# Patient Record
Sex: MALE | Race: WHITE | NOT HISPANIC OR LATINO | Employment: UNEMPLOYED
[De-identification: names, ages, dates, MRNs, and addresses within clinical notes are randomized per-mention and may not be internally consistent; named-entity substitution may affect disease eponyms.]

---

## 2017-01-08 ENCOUNTER — RECORDS - HEALTHEAST (OUTPATIENT)
Dept: ADMINISTRATIVE | Facility: OTHER | Age: 27
End: 2017-01-08

## 2018-01-01 ENCOUNTER — HOME CARE/HOSPICE - HEALTHEAST (OUTPATIENT)
Dept: HOSPICE | Facility: HOSPICE | Age: 28
End: 2018-01-01

## 2018-01-01 ENCOUNTER — APPOINTMENT (OUTPATIENT)
Dept: GENERAL RADIOLOGY | Facility: CLINIC | Age: 28
End: 2018-01-01
Attending: EMERGENCY MEDICINE
Payer: COMMERCIAL

## 2018-01-01 ENCOUNTER — HOSPITAL ENCOUNTER (EMERGENCY)
Facility: CLINIC | Age: 28
Discharge: ANOTHER HOSPITAL | End: 2018-06-07
Attending: EMERGENCY MEDICINE | Admitting: EMERGENCY MEDICINE
Payer: COMMERCIAL

## 2018-01-01 VITALS
SYSTOLIC BLOOD PRESSURE: 121 MMHG | RESPIRATION RATE: 26 BRPM | TEMPERATURE: 98 F | WEIGHT: 180 LBS | DIASTOLIC BLOOD PRESSURE: 60 MMHG | OXYGEN SATURATION: 96 %

## 2018-01-01 DIAGNOSIS — N17.9 ACUTE KIDNEY INJURY (H): ICD-10-CM

## 2018-01-01 DIAGNOSIS — K72.00 SHOCK LIVER: ICD-10-CM

## 2018-01-01 DIAGNOSIS — I46.9 CARDIAC ARREST (H): ICD-10-CM

## 2018-01-01 DIAGNOSIS — R57.9 SHOCK (H): ICD-10-CM

## 2018-01-01 DIAGNOSIS — E87.20 ACIDOSIS: ICD-10-CM

## 2018-01-01 LAB
ALBUMIN SERPL-MCNC: 3.5 G/DL (ref 3.4–5)
ALP SERPL-CCNC: 127 U/L (ref 40–150)
ALT SERPL W P-5'-P-CCNC: 7977 U/L (ref 0–70)
AMPHETAMINES UR QL SCN: NEGATIVE
ANION GAP SERPL CALCULATED.3IONS-SCNC: 18 MMOL/L (ref 6–17)
ANION GAP SERPL CALCULATED.3IONS-SCNC: 24 MMOL/L (ref 3–14)
APAP SERPL-MCNC: 5 MG/L (ref 10–20)
APTT PPP: 35 SEC (ref 22–37)
AST SERPL W P-5'-P-CCNC: 8569 U/L (ref 0–45)
BARBITURATES UR QL: NEGATIVE
BASE DEFICIT BLDA-SCNC: 16.8 MMOL/L
BASOPHILS # BLD AUTO: 0.1 10E9/L (ref 0–0.2)
BASOPHILS NFR BLD AUTO: 0.3 %
BENZODIAZ UR QL: NEGATIVE
BILIRUB SERPL-MCNC: 0.9 MG/DL (ref 0.2–1.3)
BUN SERPL-MCNC: 29 MG/DL (ref 7–30)
BUN SERPL-MCNC: 35 MG/DL (ref 5–24)
CA-I BLD-SCNC: 3.1 MG/DL (ref 4.4–5.2)
CALCIUM SERPL-MCNC: 6.4 MG/DL (ref 8.5–10.1)
CANNABINOIDS UR QL SCN: NEGATIVE
CHLORIDE BLD-SCNC: 108 MMOL/L (ref 94–109)
CHLORIDE SERPL-SCNC: 107 MMOL/L (ref 94–109)
CO2 BLD-SCNC: 16 MMOL/L (ref 20–32)
CO2 BLDCOV-SCNC: 13 MMOL/L (ref 21–28)
CO2 SERPL-SCNC: 12 MMOL/L (ref 20–32)
COCAINE UR QL: NEGATIVE
CREAT BLD-MCNC: 3.6 MG/DL (ref 0.66–1.25)
CREAT BLD-MCNC: 3.7 MG/DL (ref 0.66–1.25)
CREAT SERPL-MCNC: 3.24 MG/DL (ref 0.66–1.25)
DIFFERENTIAL METHOD BLD: ABNORMAL
EOSINOPHIL # BLD AUTO: 0 10E9/L (ref 0–0.7)
EOSINOPHIL NFR BLD AUTO: 0 %
ERYTHROCYTE [DISTWIDTH] IN BLOOD BY AUTOMATED COUNT: 12.3 % (ref 10–15)
GFR SERPL CREATININE-BSD FRML MDRD: 20 ML/MIN/1.7M2
GFR SERPL CREATININE-BSD FRML MDRD: 20 ML/MIN/1.7M2
GFR SERPL CREATININE-BSD FRML MDRD: 23 ML/MIN/1.7M2
GLUCOSE BLD-MCNC: 115 MG/DL (ref 70–99)
GLUCOSE BLDC GLUCOMTR-MCNC: 117 MG/DL (ref 70–99)
GLUCOSE BLDC GLUCOMTR-MCNC: 182 MG/DL (ref 70–99)
GLUCOSE SERPL-MCNC: 109 MG/DL (ref 70–99)
HCO3 BLD-SCNC: 11 MMOL/L (ref 21–28)
HCT VFR BLD AUTO: 42.2 % (ref 40–53)
HCT VFR BLD CALC: 40 %PCV (ref 40–53)
HGB BLD CALC-MCNC: 13.6 G/DL (ref 13.3–17.7)
HGB BLD-MCNC: 13.4 G/DL (ref 13.3–17.7)
IMM GRANULOCYTES # BLD: 0.5 10E9/L (ref 0–0.4)
IMM GRANULOCYTES NFR BLD: 2.3 %
INR PPP: 2.05 (ref 0.86–1.14)
INTERPRETATION ECG - MUSE: NORMAL
LACTATE BLD-SCNC: 9.6 MMOL/L (ref 0.7–2.1)
LACTATE BLD-SCNC: 9.7 MMOL/L (ref 0.7–2)
LYMPHOCYTES # BLD AUTO: 1.4 10E9/L (ref 0.8–5.3)
LYMPHOCYTES NFR BLD AUTO: 7 %
MCH RBC QN AUTO: 30.1 PG (ref 26.5–33)
MCHC RBC AUTO-ENTMCNC: 31.8 G/DL (ref 31.5–36.5)
MCV RBC AUTO: 95 FL (ref 78–100)
MONOCYTES # BLD AUTO: 1.5 10E9/L (ref 0–1.3)
MONOCYTES NFR BLD AUTO: 7.7 %
NEUTROPHILS # BLD AUTO: 16.2 10E9/L (ref 1.6–8.3)
NEUTROPHILS NFR BLD AUTO: 82.7 %
NRBC # BLD AUTO: 0.1 10*3/UL
NRBC BLD AUTO-RTO: 0 /100
O2/TOTAL GAS SETTING VFR VENT: 15 %
OPIATES UR QL SCN: NEGATIVE
OXYHGB MFR BLD: 94 % (ref 92–100)
PCO2 BLD: 29 MM HG (ref 35–45)
PCO2 BLDV: 38 MM HG (ref 40–50)
PCP UR QL SCN: NEGATIVE
PH BLD: 7.16 PH (ref 7.35–7.45)
PH BLDV: 7.16 PH (ref 7.32–7.43)
PLATELET # BLD AUTO: 268 10E9/L (ref 150–450)
PO2 BLD: 95 MM HG (ref 80–105)
PO2 BLDV: 62 MM HG (ref 25–47)
POTASSIUM BLD-SCNC: 5.2 MMOL/L (ref 3.4–5.3)
POTASSIUM SERPL-SCNC: 5.3 MMOL/L (ref 3.4–5.3)
PROT SERPL-MCNC: 6.8 G/DL (ref 6.8–8.8)
RBC # BLD AUTO: 4.45 10E12/L (ref 4.4–5.9)
SAO2 % BLDV FROM PO2: 84 %
SODIUM BLD-SCNC: 142 MMOL/L (ref 133–144)
SODIUM SERPL-SCNC: 143 MMOL/L (ref 133–144)
WBC # BLD AUTO: 19.6 10E9/L (ref 4–11)

## 2018-01-01 PROCEDURE — 83605 ASSAY OF LACTIC ACID: CPT | Performed by: EMERGENCY MEDICINE

## 2018-01-01 PROCEDURE — 80053 COMPREHEN METABOLIC PANEL: CPT | Performed by: EMERGENCY MEDICINE

## 2018-01-01 PROCEDURE — 99285 EMERGENCY DEPT VISIT HI MDM: CPT | Mod: 25

## 2018-01-01 PROCEDURE — 76937 US GUIDE VASCULAR ACCESS: CPT

## 2018-01-01 PROCEDURE — 96365 THER/PROPH/DIAG IV INF INIT: CPT

## 2018-01-01 PROCEDURE — 36620 INSERTION CATHETER ARTERY: CPT

## 2018-01-01 PROCEDURE — 36556 INSERT NON-TUNNEL CV CATH: CPT

## 2018-01-01 PROCEDURE — 80047 BASIC METABLC PNL IONIZED CA: CPT

## 2018-01-01 PROCEDURE — 25000128 H RX IP 250 OP 636: Performed by: EMERGENCY MEDICINE

## 2018-01-01 PROCEDURE — 27210131 ZZH KIT ART LINE INSERTION

## 2018-01-01 PROCEDURE — 00000146 ZZHCL STATISTIC GLUCOSE BY METER IP

## 2018-01-01 PROCEDURE — 40000986 XR CHEST PORT 1 VW

## 2018-01-01 PROCEDURE — 80307 DRUG TEST PRSMV CHEM ANLYZR: CPT | Performed by: EMERGENCY MEDICINE

## 2018-01-01 PROCEDURE — 94002 VENT MGMT INPAT INIT DAY: CPT

## 2018-01-01 PROCEDURE — 82565 ASSAY OF CREATININE: CPT

## 2018-01-01 PROCEDURE — 85610 PROTHROMBIN TIME: CPT | Performed by: EMERGENCY MEDICINE

## 2018-01-01 PROCEDURE — 83605 ASSAY OF LACTIC ACID: CPT

## 2018-01-01 PROCEDURE — 85730 THROMBOPLASTIN TIME PARTIAL: CPT | Performed by: EMERGENCY MEDICINE

## 2018-01-01 PROCEDURE — 96367 TX/PROPH/DG ADDL SEQ IV INF: CPT

## 2018-01-01 PROCEDURE — 96375 TX/PRO/DX INJ NEW DRUG ADDON: CPT | Mod: 59

## 2018-01-01 PROCEDURE — 80329 ANALGESICS NON-OPIOID 1 OR 2: CPT | Performed by: EMERGENCY MEDICINE

## 2018-01-01 PROCEDURE — 85025 COMPLETE CBC W/AUTO DIFF WBC: CPT | Performed by: EMERGENCY MEDICINE

## 2018-01-01 PROCEDURE — 82803 BLOOD GASES ANY COMBINATION: CPT | Mod: XU

## 2018-01-01 PROCEDURE — 31500 INSERT EMERGENCY AIRWAY: CPT

## 2018-01-01 PROCEDURE — 85014 HEMATOCRIT: CPT | Mod: 91

## 2018-01-01 PROCEDURE — 40000276 ZZH STATISTIC RCP TIME ED VENT EA 10 MIN

## 2018-01-01 PROCEDURE — 40000275 ZZH STATISTIC RCP TIME EA 10 MIN

## 2018-01-01 PROCEDURE — 82805 BLOOD GASES W/O2 SATURATION: CPT | Performed by: EMERGENCY MEDICINE

## 2018-01-01 PROCEDURE — 93005 ELECTROCARDIOGRAM TRACING: CPT

## 2018-01-01 PROCEDURE — 25000125 ZZHC RX 250: Performed by: EMERGENCY MEDICINE

## 2018-01-01 PROCEDURE — 40000965 ZZH STATISTIC END TITIAL CO2 MONITORING

## 2018-01-01 PROCEDURE — 96366 THER/PROPH/DIAG IV INF ADDON: CPT

## 2018-01-01 RX ORDER — NOREPINEPHRINE BITARTRATE/D5W 16MG/250ML
0.03-0.4 PLASTIC BAG, INJECTION (ML) INTRAVENOUS CONTINUOUS
Status: DISCONTINUED | OUTPATIENT
Start: 2018-01-01 | End: 2018-01-01 | Stop reason: HOSPADM

## 2018-01-01 RX ORDER — NOREPINEPHRINE BITARTRATE/D5W 16MG/250ML
PLASTIC BAG, INJECTION (ML) INTRAVENOUS
Status: DISCONTINUED
Start: 2018-01-01 | End: 2018-01-01 | Stop reason: HOSPADM

## 2018-01-01 RX ORDER — PROPOFOL 10 MG/ML
5-75 INJECTION, EMULSION INTRAVENOUS CONTINUOUS
Status: DISCONTINUED | OUTPATIENT
Start: 2018-01-01 | End: 2018-01-01 | Stop reason: HOSPADM

## 2018-01-01 RX ORDER — ETOMIDATE 2 MG/ML
20 INJECTION INTRAVENOUS ONCE
Status: COMPLETED | OUTPATIENT
Start: 2018-01-01 | End: 2018-01-01

## 2018-01-01 RX ADMIN — MIDAZOLAM 2 MG: 1 INJECTION INTRAMUSCULAR; INTRAVENOUS at 11:40

## 2018-01-01 RX ADMIN — ACETYLCYSTEINE 12.24 G: 200 INJECTION, SOLUTION INTRAVENOUS at 16:02

## 2018-01-01 RX ADMIN — MIDAZOLAM 2 MG: 1 INJECTION INTRAMUSCULAR; INTRAVENOUS at 12:12

## 2018-01-01 RX ADMIN — ETOMIDATE 20 MG: 2 INJECTION INTRAVENOUS at 11:26

## 2018-01-01 RX ADMIN — SODIUM BICARBONATE: 84 INJECTION, SOLUTION INTRAVENOUS at 14:12

## 2018-01-01 RX ADMIN — MIDAZOLAM 2 MG: 1 INJECTION INTRAMUSCULAR; INTRAVENOUS at 13:42

## 2018-01-01 RX ADMIN — VASOPRESSIN 2.4 UNITS/HR: 20 INJECTION INTRAVENOUS at 12:17

## 2018-01-01 RX ADMIN — PROPOFOL 20 MCG/KG/MIN: 10 INJECTION, EMULSION INTRAVENOUS at 11:37

## 2018-01-01 RX ADMIN — MIDAZOLAM 2 MG/HR: 5 INJECTION INTRAMUSCULAR; INTRAVENOUS at 14:13

## 2018-01-01 RX ADMIN — NOREPINEPHRINE BITARTRATE 0.3 MCG/KG/MIN: 1 INJECTION INTRAVENOUS at 12:16

## 2018-01-01 RX ADMIN — NOREPINEPHRINE BITARTRATE 0.36 MCG/KG/MIN: 1 INJECTION INTRAVENOUS at 12:23

## 2018-01-01 RX ADMIN — MIDAZOLAM 2 MG: 1 INJECTION INTRAMUSCULAR; INTRAVENOUS at 12:37

## 2018-01-01 RX ADMIN — EPINEPHRINE 0.06 MCG/KG/MIN: 1 INJECTION INTRAMUSCULAR; INTRAVENOUS; SUBCUTANEOUS at 12:33

## 2018-01-01 RX ADMIN — NOREPINEPHRINE BITARTRATE 0.12 MCG/KG/MIN: 1 INJECTION INTRAVENOUS at 11:49

## 2018-01-01 RX ADMIN — SODIUM CHLORIDE 2000 ML: 9 INJECTION, SOLUTION INTRAVENOUS at 11:26

## 2018-06-07 NOTE — ED PROVIDER NOTES
Assisted in care of this critically ill patient with Dr. Castillo.    Physician:  Alberto Moreira MD  Procedure note: Arterial Line, radial  Indication:  Hypotension.  Need for real time blood pressure monitoring  Consent:  Implied/Verbal; given the critical nature of the situation  Location: Radial Artery    The patient's wrist was prepped with Chloro-prep.  A sterile field was created. The  radial artery was palpated.      The needle is used to enter the artery.  Arterial blood returned in the syringe. The arterial catheter is placed over the wire.  The transducer line is connected and arterial waveform is confirmed.    The line was sewn into place.  A sterile dressing was applied.  There were no complications to the procedure.         Alberto Moreira MD  06/07/18 2294

## 2018-06-07 NOTE — ED TRIAGE NOTES
Patient presents post arrest. Per EMS patient was found unresponsive by parents in bed. When EMS came patient was found to be agonal breathing and in asystole. Compressions where started at 1046. 20 mins of CPR where preformed and a total of 2 rounds of Epi where given as well as a total of 3.2 mg of Narcan. EMS also gave patient 1/2 amp of D50 for a BG of 52. Downtime was unknown. When patient arrived he was in a sinus tach and hypotensive pressures. Patient has history of heroin and Oxy abuse and recently was in treatment for this.

## 2018-06-07 NOTE — ED PROVIDER NOTES
History     Chief Complaint:  Drug Overdose    The history is provided by the EMS personnel and a parent. The history is limited by the condition of the patient.      Garret Gabriel is a 26 year old male with an extensive history of heroin dependency and abuse who presents to the emergency department today via EMS for evaluation of a  drug overdose.  Per EMS, the patient was picked up after EMS was called by the patient's father. The patient's father reports that he was found unresponsive and breathing heavily. Upon arrival to the home, the patient was found to be in cardiac arrest. EMS did compressions for 10 minutes and administered 0.8 mg Narcan intranasally, as well as 2 separate 1.2 mg doses via intraosseous infusion. The patient was hypotensive with pressures in the 70s, and had a O2 sats in mid 90s and blood glucose of 52. The patient reportedly got out of long-term 2 weeks ago and has been staying with his father. He has been known to be doing heroin and oxycodone recently, but has had no known trauma. The patient's father corroborates this story, and includes that the patient has been to treatment for heroin addiction several times, and has been admitted to the hospital a couple times for overdose. His condition has never been this bad.    Allergies:  No Known Drug Allergies    Medications:    Medications reviewed. No pertinent medications.    Past Medical History:    Substance abuse    Past Surgical History:    History reviewed. No pertinent surgical history.    Family History:    History reviewed. No pertinent family history.    Social History:  The patient was accompanied to the Emergency Department by EMS.  Illicit drug use: positive - history of heroin use 8 years      Review of Systems   Unable to perform ROS: Intubated (+ Sedation, + acuity of condition)     Physical Exam     Patient Vitals for the past 24 hrs:   BP Temp Heart Rate Resp SpO2 Weight   06/07/18 1450 - - - - 90 % -   06/07/18 1428 - - - 26 -  -   06/07/18 1427 - - - 27 - -   06/07/18 1426 - - - 26 - -   06/07/18 1424 - - - 28 - -   06/07/18 1423 - - - 26 - -   06/07/18 1422 - - - 27 - -   06/07/18 1421 - - - 26 - -   06/07/18 1419 - - - 26 - -   06/07/18 1418 - - - 25 - -   06/07/18 1417 - - - 25 - -   06/07/18 1400 123/59 - - - - -   06/07/18 1330 114/57 - - - - -   06/07/18 1318 - - 116 27 93 % -   06/07/18 1315 - - 115 - 93 % -   06/07/18 1305 - - 115 26 93 % -   06/07/18 1300 97/57 - 114 26 93 % -   06/07/18 1255 - - - - - 81.6 kg (180 lb)   06/07/18 1245 - - - - 94 % -   06/07/18 1239 97/49 - 110 (!) 31 91 % -   06/07/18 1236 93/54 - 107 30 93 % -   06/07/18 1233 90/50 - 111 28 95 % -   06/07/18 1230 (!) 86/53 - 111 - 96 % -   06/07/18 1224 (!) 88/47 - 112 - 95 % -   06/07/18 1221 (!) 88/48 - 112 - 95 % -   06/07/18 1215 (!) 83/43 - 114 - 95 % -   06/07/18 1212 (!) 84/67 - 121 (!) 34 93 % -   06/07/18 1209 (!) 85/46 - 115 30 93 % -   06/07/18 1206 (!) 83/46 - 115 29 92 % -   06/07/18 1200 (!) 77/48 - 120 - 93 % -   06/07/18 1157 (!) 79/48 - 121 29 93 % -   06/07/18 1151 (!) 75/45 - 124 (!) 33 93 % -   06/07/18 1148 (!) 75/48 - 128 (!) 34 93 % -   06/07/18 1145 (!) 77/46 - 125 - 92 % -   06/07/18 1142 (!) 80/41 - 130 (!) 35 92 % -   06/07/18 1140 (!) 89/49 - 134 (!) 36 94 % -   06/07/18 1135 (!) 86/48 - 116 (!) 33 97 % -   06/07/18 1130 (!) 83/51 - 118 - 98 % -   06/07/18 1125 98/54 98  F (36.7  C) 124 (!) 39 98 % -      Physical Exam  Nursing note and vitals reviewed.  Constitutional: Obtunded.    HENT:   Mouth/Throat: No bleeding/signs of emesis.   Eyes: nonicteric sclera, pupils 3mm and reactive bilaterally.   Cardiovascular: tachycardic, regular rhythm  Pulmonary/Chest: Tachypneic. Coarse breath sounds bilaterally.   Abdominal: Soft. nondistended, no guarding or rigidity  Musculoskeletal: Normal range of motion.   Neurological: Obtunded. GCS 6 (withdraws to pain).   Skin: Skin is warm and dry. No rash noted.       Emergency Department Course  "    ECG  ECG taken at 1125. ECG read at 1421  Sinus tachycardia  Nonspecific  ST abnormality  Abnormal ECG  Rate 126 bpm, AZ interval 128 ms, QRS duration 86 ms, QT/QTc 318/460, P-R-T axes 72 66 28.    Imaging:  Radiology findings were communicated with the family who voiced understanding of the findings.    Chest  XR, 1 view PORTABLE  Endotracheal tube is again identified at the thoracic  inlet in similar location. The esophagogastric tube courses below the  level of the diaphragm, tip in the stomach. Left IJ central venous  catheter is new with tip in good location at the RA/SVC junction. No  definite pneumothorax. Again, suggestion of left lower lobe  Opacification/consolidation.  Reading per radiology    XR Chest Port 1 View  Heart size is normal. Endotracheal tube is visible, above  the level of the clavicular heads, 7.2 cm proximal to the kristofer.  Consideration could be made for advancing 3-4 cm, if desired. No  obvious pleural effusion or pneumothorax. There may be retrocardiac  left lower lobe opacity, perhaps consider follow-up radiograph for  Comparison.  Reading per radiology    Laboratory:  Laboratory findings were communicated with the family who voiced understanding of the findings.    Drug Abuse: Negative  ISTAT basic met: total CO2 16, Anion Gap 18, Glucose 115, BUN 35, Creatinine 3.7, GFR 20, Calcium Ionized 3.1  ISTAT lactate: pH 7.16, pCO2 38, pO2 62, Bicarbonate 13, Lactic acid 9.6  Creatinine POCT: Creatinine 3.6, GFR 20  CBC: WBC 19.6, HGB 13.4,   CMP: CO2 12, Anion Gap 24, Glucose 109, Creatinine 3.24, GFR 23, Calcium 6.4, ALT 7977, AST 8569  Lactic Acid: 9.7  PTT: 35  INR: 2.05  Glucose (1128): 117    Procedures:  Intubation:   Date/Time: 06/07/18  1127  Performed by: Jef Castillo MD     The procedure was performed in an emergent situation.    Time out: Immediately prior to procedure a \"time out\" was called to verify the correct patient, procedure, equipment, support staff " and site/side marked as required.    Indication: Acute respiratory failure. and Airway protection.    Intubation method: Direct  Patient status: RSI  Preoxygenation: Mask  Pretreatment medications: None  Sedatives: Etomidate  Paralytic: succinylcholine  Laryngoscope size: Mac 4  Tube size: 7.5 cuffed with cuff inflated after placement  Number of attempts: 1  Cricoid pressure: yes  Cords visualized: yes  Post-procedure assessment: Breath sounds equal bilaterally with chest rise and absent over the epigastrium.  ETT to teeth: 23 cm - Later advanced to 26cm after CXR.   Tube secured with: ETT haines    Patient tolerated the procedure well with no immediate complications.  Complications:  None        Central Line Placement.   PHYSICIAN: Jef Castillo MD  PROCEDURE:  Central Line Placement with Ultrasound Guidance.  INDICATIONS:  Vascular access, Medication administration.   CONSENT: Emergent procedure.   TIMEOUT:   Universal protocol was followed. A TIME OUT Was conducted just prior to starting procedure confirmed patient identity, site/side, procedure, patient position, and availability of correct equipment.  MEDICATION: None  DESCRIPTION OF PROCEDURE:  Left Internal Jugular approach was selected and the left anterior neck was prepped, cleansed and draped in a sterile fashion.  Mask, gown and gloves were used per sterile  protocol.  Patient was then placed into Trendelenburg position and lidocaine was used for local anesthesia.  Landmarks were identified and Vascular probe with ultrasound was used in a sterile fashion for guidence.  Introducer needle was then used to gain access to the central venous circulation.  Using Seldinger technique the  Triple lumen catheter was placed.  Catheter port(s) were aspirated and flushed.  Central line was sutured in place and sterile dressing applied.  Patient tolerated the procedure   well. There were  no complications.Appropriate placement was confirmed by x-ray and no  pneumothorax was visualized.             Interventions:  1126 NS, 2 L, IV   1126 Etomidate 20 mg IV  1127 Succinylcholine 120 mg IV  1137 Propofol 20 mcg/kg/min IV  1139 Propofol rate change: 30 mcg/kg/min  1140 Versed 2 mg IV  1149 Norepinephrine 0.12 mcg/kg/min IV  1155 Norepinephrine rate change: 0.24 mcg/kg/min IV  1212 Versed 2 mg IV  1217 Norepinephrine rate change: 0.30 mcg/kg/min IV  1223 Norepinephrine rate change: 0.36 mcg/kg/min IV  1233 Epinephrine 0.06 mcg/kg/min IV (later able to be weaned off)  1237 Versed 2 mg IV  1342 Versed 2 mg IV  1413 Versed 2 mg/hr drip IV  1413 Sodium Bicarbonate 150 mEq in 5% dextrose 150 mL/hr IV    Emergency Department Course:    1121  Patient arrived in Emergency Department.    1122  Patient transferred to Emergency Department bed.    1122  Patient placed on Emergency Department oxygen.     1123  20 mg etomidate and 120 mg succinate ordered.    1124 ISTAT lactat and ICa ordered.    1124  BP:83/55  HR: 124  O2: 98%    1125  Pressure bolus started IO. 1000 mL.    1126  20 mg etomidate administered IV.    1127 120 mg succinylcholine administered IV.    1127  Patient was intubated.    1129  Propofol ready for administration, hanging at bedside.     1130  CXR ordered, NG ordered.    1133  Propofol ordered at 20mcg/kg/min.    1137  Propofol administered IV @ 20 mcg/kg/min.    1139  Propofol rate increased to 30 mcg/kg/min.    1139  2 mg Versed ordered.    1140  2 mg Versed administered IV.    1143 CXR taken. Results above.    1147  Thermosensitive ramirez catheter ordered.    1149  Norepinephrine administered at 0.12 mcg/kg/min.    1150  Second liter of saline started.    1152  Thermosensitive ramirez catheter placed.    1155  Norepinephrine changed to 0.24 mcg/kg/min.    1204  Central line being prepared.    1205  Arterial line placed right wrist.    1207  Ultrasound for central line in use. Blood was drawn for laboratory testing, results above.     1209  Central line  placed.    1210  Vasopressin ordered. Propofol drip stopped.    1211  1-2mg Versed drip PRN qh ordered.    1212  2 mg Versed IV.    1217  Norepinephrine increased to 0.3 mcg/kg/min.    1217  Vasopressin 2.4 units/hr IV.    1223  Norepinephrine increased to 0.36 mcg/kg/min.    1224  BP: 88/44 HR: 112  O2: 95    1226  OG tube placed.     1228  Restraints placed.    1233  Epinephrine drip started at 0.06 mcg/kg/min IO.    1237 Versed 2 mg IV.    1237 Discussed with poison control.     1238  CXR done. Central line confirmed in correct location. OG tube confirmed successful placement.    1240  I discussed the situation with the patient's family and gave them a chance to see the patient.     1249 IV was inserted and blood was drawn for laboratory testing, results above.     1328 I spoke with the Intensivist from [facility] regarding patient's presentation, findings, and plan of care.     1420 I personally reviewed the lab, imaging, and procedure results with the family and answered all related questions prior to transfer to [facility].    Impression & Plan      Medical Decision Making:  Garret Gabriel is a 26 year old male who presents to the emergency department today for evaluation following a cardiac arrest, likely 2/2 respiratory arrest 2/2 drug use. Pt with long history of drug use. On arrival, pt is breathing spontaneously, but obtunded, and requires airway protection. Intubated as above without complication. Additionally, pt persistently hypotensive, therefore pressors were started and a central line was placed. Pt ultimately required 3 pressors for a short period of time until we were able to wean the epinephrine to off. Pt hemodynamically stabilized on pressors. Notably with acute kidney injury and shock liver 2/2 his hypotensive state. Started NAC at poison control's recommendation given his significant LFT levels. Tylenol level 5. Pt reportedly has history of abusing tylenol containing opiates, but unlikely  today. Pt's blood gas showed metabolic acidosis, 2/2 lactate from arrest as well as renal failure. Bicarb gtt initiated for pH < 7.2. Pt's HR elevated, but decreasing, likely 2/2 pressors and arrest. UDS negative suggesting possible other etiology or synthetic opiate use such as fentanyl/carfentanyl. Discussed with poison control as to whether they've seen any pressor resistant hypotension in the context of opiate overdose and they state they haven't. Most likely etiology is hypoperfusion from arrest, but does seem to be improving at time of transfer, as MAP has risen above 70. Discussed several times with family, who note that pt is a full code. They shared his very long history of drug abuse. They were interested in tests for brain injury, but I explained that we usually wait some time before performing these. I explained that it's too early to have the answer to a lot of their questions concerning his prognosis, but that there are positives in that he does have brain stem activity and withdraws, but also negatives with his blood pressure, shock liver, and kidney injury. Ultimately, we will have to see how pt does. Unfortunately, our hospitalist was not comfortable with pt in our ICU, [facility] and [facility] were full. Pt's family was ok with transfer to [facility] which was arranged and Dr. Warren accepted Pt transferred in stabilized, but critical condition.       Critical care time: 90 minutes not including procedures.     Diagnosis:    ICD-10-CM    1. Cardiac arrest (H) I46.9    2. Acidosis E87.2    3. Shock (H) R57.9    4. Shock liver K72.00    5. Acute kidney injury (H) N17.9      Disposition:   Transfer to [facility]    Scribe Disclosure:  I, Santiago Miller, am serving as a scribe at 12:49 PM on 6/7/2018 to document services personally performed by Jef Castillo MD based on my observations and the provider's statements to me.      [facility] EMERGENCY DEPARTMENT        Jef Castillo MD  06/07/18 6004

## 2018-06-07 NOTE — PROGRESS NOTES
Pt is a 26 yom came into ED post Heroin OD found down by Dad at home. Pt was Intubated with a size 7.5 ett 23 at the lip,right oral. Bs are equal and Bilat with ETC02 28-30. CMV/14/550/50%/peep of 5. Will continue to follow per M.D. Orders.

## 2018-06-14 ENCOUNTER — HOME CARE/HOSPICE - HEALTHEAST (OUTPATIENT)
Dept: HOSPICE | Facility: HOSPICE | Age: 28
End: 2018-06-14